# Patient Record
Sex: FEMALE | Race: BLACK OR AFRICAN AMERICAN | Employment: FULL TIME | URBAN - METROPOLITAN AREA
[De-identification: names, ages, dates, MRNs, and addresses within clinical notes are randomized per-mention and may not be internally consistent; named-entity substitution may affect disease eponyms.]

---

## 2021-07-20 PROBLEM — E78.5 HYPERLIPIDEMIA: Status: ACTIVE | Noted: 2021-07-20

## 2021-07-20 PROBLEM — E66.01 MORBID OBESITY (HCC): Status: ACTIVE | Noted: 2021-07-20

## 2021-07-20 PROBLEM — E11.9 DIABETES MELLITUS TYPE 2, CONTROLLED (HCC): Status: ACTIVE | Noted: 2021-07-20

## 2021-08-27 ENCOUNTER — HOSPITAL ENCOUNTER (OUTPATIENT)
Dept: GENERAL RADIOLOGY | Age: 31
Discharge: HOME | End: 2021-08-27
Attending: SURGERY
Payer: COMMERCIAL

## 2021-08-27 DIAGNOSIS — Z87.19 HX OF GASTROESOPHAGEAL REFLUX (GERD): ICD-10-CM

## 2021-08-27 PROCEDURE — 74011000250 HC RX REV CODE- 250: Performed by: SURGERY

## 2021-08-27 PROCEDURE — 74246 X-RAY XM UPR GI TRC 2CNTRST: CPT

## 2021-08-27 RX ADMIN — BARIUM SULFATE 135 ML: 980 POWDER, FOR SUSPENSION ORAL at 12:03

## 2021-08-27 RX ADMIN — BARIUM SULFATE 700 MG: 700 TABLET ORAL at 12:03

## 2021-08-27 RX ADMIN — BARIUM SULFATE 355 ML: 0.6 SUSPENSION ORAL at 12:03

## 2021-08-27 RX ADMIN — ANTACID/ANTIFLATULENT 8 G: 380; 550; 10; 10 GRANULE, EFFERVESCENT ORAL at 12:03

## 2021-08-30 ENCOUNTER — APPOINTMENT (OUTPATIENT)
Dept: PHYSICAL THERAPY | Age: 31
End: 2021-08-30
Attending: SURGERY

## 2021-09-08 ENCOUNTER — APPOINTMENT (OUTPATIENT)
Dept: PHYSICAL THERAPY | Age: 31
End: 2021-09-08
Attending: SURGERY
Payer: COMMERCIAL

## 2021-09-13 ENCOUNTER — HOSPITAL ENCOUNTER (OUTPATIENT)
Dept: PHYSICAL THERAPY | Age: 31
Discharge: HOME | End: 2021-09-13
Attending: SURGERY
Payer: COMMERCIAL

## 2021-09-13 PROCEDURE — 97110 THERAPEUTIC EXERCISES: CPT

## 2021-09-13 PROCEDURE — 97161 PT EVAL LOW COMPLEX 20 MIN: CPT

## 2021-09-13 NOTE — PROGRESS NOTES
Adelia Mclaughlin  : 1990  Payor: [employer] / Plan: [facility] / Product Type: PPO /  [address]  at [facility] SOURAV EMERSON  [address] [place] [address], [address], [place] [address].  [phone]   [phone]       OUTPATIENT PHYSICAL THERAPY: Daily Treatment Note 2021  Visit Count: 1     ICD-10: Treatment Diagnosis: Generalized weakness (M62.81)  Precautions/Allergies:   Patient has no allergy information on record. TREATMENT PLAN:  Effective Dates: 2021  Frequency/Duration: 1 visit MEDICAL/REFERRING DIAGNOSIS:  Morbid (severe) obesity due to excess calories [E66.01]   DATE OF ONSET: Chronic  REFERRING PHYSICIAN: Arie Duverney, MD MD Orders: Evaluate and treat  Return MD Appointment: TBD       Pre-treatment Symptoms/Complaints: See evaluation note for details. Pain: Initial:   0/10 Post Session:  Not rated   Medications Last Reviewed: 2021    Updated Objective Findings:   See evaluation note from today     TREATMENT:     THERAPEUTIC EXERCISE (20 minutes) to facilitate improved health and fitness in order to lose weight. Patient performed sit to stands, bridging, step ups, scapular retraction, shoulder flexion and scaption with theraband, wall push ups, shoulder press, bicep curls, and triceps extensions. Patient performed 10 repetitions of each, with cues and demonstration provided on each. HEP: Patient received handout for the exercises above. Treatment/Session Summary:    · Response to Treatment:  Patient did well with exercises. · Communication/Consultation:  None today  · Equipment provided today:  None today  · Recommendations/Intent for next treatment session: Patient will be discharged to aforementioned HEP.     Total Treatment Billable Duration:  20 minutes for therex + 10 minutes for eval  PT Patient Time In/Time Out  Time In: 7855  Time Out: Vicente Andrew 1, PT    Future Appointments   Date Time Provider Yaya Cazares 10/27/2021  1:00 PM Christopher Jaimes PA CSAE CSAE

## 2021-09-13 NOTE — THERAPY EVALUATION
Heaven Henley  : 1990  Primary: [facility]*  Secondary:  [facility] [address], [address] [place] [address],[address], [address] [facility]  [phone]   [phone]         OUTPATIENT PHYSICAL THERAPY:Initial Assessment and Discharge Summary 2021   ICD-10: Treatment Diagnosis: Generalized weakness (M62.81)  Precautions/Allergies:   Patient has no allergy information on record. TREATMENT PLAN:  Effective Dates: 2021  Frequency/Duration: 1 visit MEDICAL/REFERRING DIAGNOSIS:  Morbid (severe) obesity due to excess calories [E66.01]   DATE OF ONSET: Chronic  REFERRING PHYSICIAN: Olevia Homans, MD MD Orders: Evaluate and treat  Return MD Appointment: TBD     INITIAL ASSESSMENT:  Ms. Apryl Medina presents in preparation for bariatric surgery in order to lose weight and improve her health. She does not exhibit any demonstrable impairments which justify the need for skilled PT intervention. PROBLEM LIST (Impacting functional limitations):  1. Decreased [place] with Home Exercise Program INTERVENTIONS PLANNED: (Treatment may consist of any combination of the following)  1. Home Exercise Program (HEP)     GOALS: (Goals have been discussed and agreed upon with patient.)    Discharge Goals: Time Frame: 1 visit  1. Patient will be independent with HEP. MET 21    OUTCOME MEASURE:   Tool Used: Lower Extremity Functional Scale (LEFS)  Score:  Initial: 80/80 Most Recent:  (Date: -- )   Interpretation of Score: 20 questions each scored on a 5 point scale with 0 representing \"extreme difficulty or unable to perform\" and 4 representing \"no difficulty\". The lower the score, the greater the functional disability. 80/80 represents no disability. Minimal detectable change is 9 points. MEDICAL NECESSITY:   · Patient does not exhibit impairments and limitations which require continued therapy intervention at this time. .  REASON FOR SERVICES/OTHER COMMENTS:  · Patient exhibits no functional impairments which necessitate continued PT intervention. .  Total Duration:30 minutes  PT Patient Time In/Time Out  Time In: 6925  Time Out: 1020    Rehabilitation Potential For Stated Goals: Good  Regarding Sumaya Haider's therapy, I certify that the treatment plan above will be carried out by a therapist or under their direction. Thank you for this referral,  Angie Parents, PT     Referring Physician Signature: Ted Bee MD No Signature is Required for this note. PAIN/SUBJECTIVE:   Initial:   0/10 Post Session:  None reported   HISTORY:   History of Injury/Illness (Reason for Referral):  Patient reports that she wants to have bariatric surgery in order to aid in her weight loss efforts and overall health. She is hopeful that the surgery will improve her health. Denies any reports of pain today. Past Medical History/Comorbidities:   Ms. Pemberton  has a past medical history of Diabetes ([facility] [address] Hyperlipidemia, and Morbid obesity ([facility] [address] Ms. Pemberton  has a past surgical history that includes hx cholecystectomy (05/02/2018). Social History/Living Environment:    Patient lives alone in a 2nd floor apartment. 13 steps to second level. Prior Level of Function/Work/Activity:  Patient works full-time as a  at [employer]. Ambulatory/Rehab Services H2 Model Falls Risk Assessment   Risk Factors:       No Risk Factors Identified Ability to Rise from Chair:       (0)  Ability to rise in a single movement   Falls Prevention Plan:       No modifications necessary   Total: (5 or greater = High Risk): 0   ©2010 [facility] of [employer]. All Rights Reserved. [facility] States Patent #[id number]. Federal Law prohibits the replication, distribution or use without written permission from [facility] [employer]   Current Medications: per EMR      Current Outpatient Medications:     empagliflozin (JARDIANCE) 25 mg tablet, Take 25 mg by mouth daily. , Disp: , Rfl:     glipiZIDE (GLUCOTROL) 5 mg tablet, Take 5 mg by mouth daily. , Disp: , Rfl:     insulin NPH/insulin regular (HumuLIN 70/30 U-100 Insulin) 100 unit/mL (70-30) injection, 8 Units by SubCUTAneous route two (2) times a day., Disp: , Rfl:     ezetimibe (ZETIA) 10 mg tablet, Take 10 mg by mouth daily. , Disp: , Rfl:    Date Last Reviewed:  9/13/2021   Number of Personal Factors/Comorbidities that affect the Plan of Care: 1-2: MODERATE COMPLEXITY   EXAMINATION:   9/13/21    Observation/Orthostatic Postural Assessment:          Patient exhibits slightly elevated shoulders bilaterally. No antalgic gait pattern. Mildly kyphotic posture. ROM:          Cervical and lumbar ROM grossly within functional limits. Bilateral upper and lower extremity range of motion. Strength:          Bilateral shoulder flexion 5/5 B        Bilateral shoulder abduction 5/5 B        Bilateral elbow flexion 5/5 B        Bilateral hip flexion 5/5 B         Bilateral knee extension 5/5 B   Body Structures Involved:  1. Nerves  2. Bones  3. Joints  4. Muscles  5. Ligaments Body Functions Affected:  1. Sensory/Pain  2. Neuromusculoskeletal  3. Movement Related Activities and Participation Affected:  1. General Tasks and Demands  2. Mobility  3. Domestic Life  4.  Interpersonal Interactions and Relationships   Number of elements (examined above) that affect the Plan of Care: 4+: HIGH COMPLEXITY   CLINICAL PRESENTATION:   Presentation: Stable and uncomplicated: LOW COMPLEXITY   CLINICAL DECISION MAKING:   Use of outcome tool(s) and clinical judgement create a POC that gives a: Clear prediction of patient's progress: LOW COMPLEXITY

## 2021-10-27 ENCOUNTER — HOSPITAL ENCOUNTER (OUTPATIENT)
Dept: LAB | Age: 31
Discharge: HOME | End: 2021-10-27
Attending: SURGERY
Payer: COMMERCIAL

## 2021-10-27 DIAGNOSIS — Z01.812 ENCOUNTER FOR PRE-OPERATIVE LABORATORY TESTING: ICD-10-CM

## 2021-10-27 LAB
25(OH)D3 SERPL-MCNC: 14.1 NG/ML (ref 30–100)
EST. AVERAGE GLUCOSE BLD GHB EST-MCNC: 289 MG/DL
HBA1C MFR BLD: 11.7 % (ref 4.2–6.3)
TSH SERPL DL<=0.005 MIU/L-ACNC: 5.16 UIU/ML

## 2021-10-27 PROCEDURE — 84443 ASSAY THYROID STIM HORMONE: CPT

## 2021-10-27 PROCEDURE — 80323 ALKALOIDS NOS: CPT

## 2021-10-27 PROCEDURE — 36415 COLL VENOUS BLD VENIPUNCTURE: CPT

## 2021-10-27 PROCEDURE — 82306 VITAMIN D 25 HYDROXY: CPT

## 2021-10-27 PROCEDURE — 83036 HEMOGLOBIN GLYCOSYLATED A1C: CPT

## 2021-11-02 LAB
COTININE SERPL-MCNC: 29.4 NG/ML
NICOTINE SERPL-MCNC: 1.1 NG/ML

## 2021-11-17 ENCOUNTER — HOSPITAL ENCOUNTER (OUTPATIENT)
Dept: LAB | Age: 31
Discharge: HOME | End: 2021-11-17
Attending: PHYSICIAN ASSISTANT
Payer: COMMERCIAL

## 2021-11-17 DIAGNOSIS — Z01.812 ENCOUNTER FOR PRE-OPERATIVE LABORATORY TESTING: ICD-10-CM

## 2021-11-17 LAB
EST. AVERAGE GLUCOSE BLD GHB EST-MCNC: 341 MG/DL
HBA1C MFR BLD: 13.5 % (ref 4.2–6.3)

## 2021-11-17 PROCEDURE — 36415 COLL VENOUS BLD VENIPUNCTURE: CPT

## 2021-11-17 PROCEDURE — 80323 ALKALOIDS NOS: CPT

## 2021-11-17 PROCEDURE — 83036 HEMOGLOBIN GLYCOSYLATED A1C: CPT

## 2021-11-23 LAB
COTININE SERPL-MCNC: 29.2 NG/ML
NICOTINE SERPL-MCNC: 1.6 NG/ML

## 2022-03-19 PROBLEM — E78.5 HYPERLIPIDEMIA: Status: ACTIVE | Noted: 2021-07-20

## 2022-03-19 PROBLEM — E66.01 MORBID OBESITY (HCC): Status: ACTIVE | Noted: 2021-07-20

## 2022-03-19 PROBLEM — E11.9 DIABETES MELLITUS TYPE 2, CONTROLLED (HCC): Status: ACTIVE | Noted: 2021-07-20

## 2022-09-15 ENCOUNTER — TELEPHONE (OUTPATIENT)
Dept: SURGERY | Age: 32
End: 2022-09-15

## 2022-09-15 NOTE — TELEPHONE ENCOUNTER
Returned patients call concern her FMLA. She has faxed it over, however we have not received it yet. She ill check back in the morning, I also let her know there is $25  to fill out these papers.